# Patient Record
Sex: FEMALE | Race: BLACK OR AFRICAN AMERICAN | NOT HISPANIC OR LATINO | URBAN - METROPOLITAN AREA
[De-identification: names, ages, dates, MRNs, and addresses within clinical notes are randomized per-mention and may not be internally consistent; named-entity substitution may affect disease eponyms.]

---

## 2018-08-13 NOTE — ASU PATIENT PROFILE, ADULT - PSH
Elective surgery  Abdominal plasty  Elective surgery  B/L ovarian cyst removed  H/O myomectomy    H/O tubal ligation

## 2018-08-14 ENCOUNTER — OUTPATIENT (OUTPATIENT)
Dept: OUTPATIENT SERVICES | Facility: HOSPITAL | Age: 73
LOS: 1 days | End: 2018-08-14
Payer: MEDICARE

## 2018-08-14 VITALS
TEMPERATURE: 99 F | RESPIRATION RATE: 17 BRPM | HEIGHT: 62 IN | SYSTOLIC BLOOD PRESSURE: 154 MMHG | DIASTOLIC BLOOD PRESSURE: 73 MMHG | WEIGHT: 179.46 LBS | HEART RATE: 52 BPM | OXYGEN SATURATION: 100 %

## 2018-08-14 VITALS
SYSTOLIC BLOOD PRESSURE: 153 MMHG | RESPIRATION RATE: 12 BRPM | HEART RATE: 55 BPM | DIASTOLIC BLOOD PRESSURE: 82 MMHG | OXYGEN SATURATION: 100 %

## 2018-08-14 DIAGNOSIS — H35.341 MACULAR CYST, HOLE, OR PSEUDOHOLE, RIGHT EYE: ICD-10-CM

## 2018-08-14 DIAGNOSIS — Z98.890 OTHER SPECIFIED POSTPROCEDURAL STATES: Chronic | ICD-10-CM

## 2018-08-14 DIAGNOSIS — Z98.51 TUBAL LIGATION STATUS: Chronic | ICD-10-CM

## 2018-08-14 DIAGNOSIS — Z41.9 ENCOUNTER FOR PROCEDURE FOR PURPOSES OTHER THAN REMEDYING HEALTH STATE, UNSPECIFIED: Chronic | ICD-10-CM

## 2018-08-14 PROCEDURE — C1814: CPT

## 2018-08-14 PROCEDURE — 67042 VIT FOR MACULAR HOLE: CPT | Mod: RT

## 2018-08-14 NOTE — ASU DISCHARGE PLAN (ADULT/PEDIATRIC). - PT EDUC
[employer] gas card, Eye shield with instructions , sunglasses and eye kit given to patient./other (specify)

## 2020-05-13 NOTE — ASU DISCHARGE PLAN (ADULT/PEDIATRIC). - ASU FOLLOWUP
Denita called returning Eden's call. She says she reached out to her PCP and hem in [place] and gave them the fax number for [employer]. She says she will call back and check at the end of the day to see if they were received.  If not she says she will reschedu
911 or go to the nearest Emergency Room

## 2023-11-09 NOTE — ASU PREOP CHECKLIST - TEMPERATURE IN CELSIUS (DEGREES C)
Patient left a Voice Message returning a call from Shanique, regarding US Liver (11/06/2023 08:58)   Requested a call back.   [phone]